# Patient Record
Sex: FEMALE | Race: WHITE | Employment: OTHER | URBAN - METROPOLITAN AREA
[De-identification: names, ages, dates, MRNs, and addresses within clinical notes are randomized per-mention and may not be internally consistent; named-entity substitution may affect disease eponyms.]

---

## 2018-02-27 ENCOUNTER — OFFICE VISIT (OUTPATIENT)
Dept: UROLOGY | Facility: HOSPITAL | Age: 73
End: 2018-02-27
Attending: OBSTETRICS & GYNECOLOGY
Payer: MEDICARE

## 2018-02-27 VITALS
BODY MASS INDEX: 20.76 KG/M2 | SYSTOLIC BLOOD PRESSURE: 104 MMHG | WEIGHT: 103 LBS | DIASTOLIC BLOOD PRESSURE: 68 MMHG | HEIGHT: 59 IN

## 2018-02-27 DIAGNOSIS — R35.1 NOCTURIA: Primary | ICD-10-CM

## 2018-02-27 DIAGNOSIS — N39.3 FEMALE STRESS INCONTINENCE: ICD-10-CM

## 2018-02-27 DIAGNOSIS — N95.2 POSTMENOPAUSAL ATROPHIC VAGINITIS: ICD-10-CM

## 2018-02-27 DIAGNOSIS — N81.2 UTEROVAGINAL PROLAPSE, INCOMPLETE: ICD-10-CM

## 2018-02-27 DIAGNOSIS — N81.84 PELVIC MUSCLE WASTING: ICD-10-CM

## 2018-02-27 LAB
BLOOD URINE: NEGATIVE
CONTROL RUN WITHIN 24 HOURS?: YES
LEUKOCYTE ESTERASE URINE: NEGATIVE
NITRITE URINE: NEGATIVE

## 2018-02-27 PROCEDURE — 87086 URINE CULTURE/COLONY COUNT: CPT | Performed by: OBSTETRICS & GYNECOLOGY

## 2018-02-27 PROCEDURE — 81002 URINALYSIS NONAUTO W/O SCOPE: CPT

## 2018-02-27 PROCEDURE — 99201 HC OUTPT EVAL AND MGNT NEW PT LEVEL 1: CPT

## 2018-02-27 NOTE — PROGRESS NOTES
Diamond Barton DO  2/27/2018     Referred by Dr. Kyle Christian    Patient presents with:  Prolapse: c/o bulge in vagina, leaking on the way to the bathroom, referred by Dr. Aldair Greenberg, tried pessary without success      HPI:  Some PETRA noted with pessary  Rare UUI +atrophy, nontender  Bladder:+fullness, nontender  Vagina: +atrophy  Cervix: no bleeding, no lesions, nontender  Uterus: +mobile  Adnexa:no masses, nontender  Perineum: nontender  Anus: wnl  Rectum: defer    PELVIS FLOOR NEUROMUSCULAR FUNCTION:  Strength: Hysterectomy-Vag, Abd, LAVH, Laporascopic Sacrocolpoxy Hysterectomy, Total Laporoscopic Hysterectomy, Robotic  Risks/benefits of salpingooophorectomy  Anterior repair  Posterior repair  Enterocele repair  Uterosacral suspension  Mid-urethral slings Darío Farias

## 2018-02-28 ENCOUNTER — TELEPHONE (OUTPATIENT)
Dept: UROLOGY | Facility: HOSPITAL | Age: 73
End: 2018-02-28

## 2018-03-05 ENCOUNTER — OFFICE VISIT (OUTPATIENT)
Dept: UROLOGY | Facility: HOSPITAL | Age: 73
End: 2018-03-05
Attending: OBSTETRICS & GYNECOLOGY
Payer: MEDICARE

## 2018-03-05 VITALS
WEIGHT: 103 LBS | BODY MASS INDEX: 20.76 KG/M2 | DIASTOLIC BLOOD PRESSURE: 72 MMHG | HEIGHT: 59 IN | SYSTOLIC BLOOD PRESSURE: 102 MMHG

## 2018-03-05 DIAGNOSIS — R35.1 NOCTURIA: Primary | ICD-10-CM

## 2018-03-05 DIAGNOSIS — N39.41 URGE INCONTINENCE: ICD-10-CM

## 2018-03-05 DIAGNOSIS — N81.2 UTEROVAGINAL PROLAPSE, INCOMPLETE: ICD-10-CM

## 2018-03-05 DIAGNOSIS — N39.3 FEMALE STRESS INCONTINENCE: ICD-10-CM

## 2018-03-05 DIAGNOSIS — N95.2 POSTMENOPAUSAL ATROPHIC VAGINITIS: ICD-10-CM

## 2018-03-05 DIAGNOSIS — N81.84 PELVIC MUSCLE WASTING: ICD-10-CM

## 2018-03-05 PROCEDURE — 51784 ANAL/URINARY MUSCLE STUDY: CPT

## 2018-03-05 PROCEDURE — 51797 INTRAABDOMINAL PRESSURE TEST: CPT

## 2018-03-05 PROCEDURE — 51741 ELECTRO-UROFLOWMETRY FIRST: CPT

## 2018-03-05 PROCEDURE — 51729 CYSTOMETROGRAM W/VP&UP: CPT

## 2018-03-05 NOTE — PROCEDURES
Patient here for urodynamic testing. Procedure explained and confirmed by patient. See evaluation form for results. Both verbal and written discharge instructions were given.   Patient tolerated procedure well and will follow up with Dr. Kodak Stone mL/min  Temp:  Room  Position:  [x]  Sit  []  Stand  []  Supine  First sensation:   25 mL  First desire to void:   100 mL  Strong desire to void:  339 mL  Maximum cystometric capacity:   459 mL  Bladder Compliance:   67 mL/cm water  Detrusor Activity:  []

## 2018-03-05 NOTE — PATIENT INSTRUCTIONS
[facility]  [address] [place] [address] 67, [address]  Office: [phone]      Urodynamic Testing Discharge Instructions: There are NO dietary or activity restrictions. You may resume your normal schedule.       You may hav at any time  · Chest pain or trouble breathing  · Vaginal bleeding heavier than a period  · Redness, tenderness or swelling of your legs  · Pain or burning when you urinate  · Redness, pain or foul discharge from incision          [address] with any pain medications (Motrin) as directed by the nurse. · Continue with your current bowel regime; avoid constipation. What if I have trouble emptying my bladder?   If you are having trouble emptying your bladder, try the following tips:  · Urinate

## 2018-03-09 ENCOUNTER — OFFICE VISIT (OUTPATIENT)
Dept: UROLOGY | Facility: HOSPITAL | Age: 73
End: 2018-03-09
Attending: OBSTETRICS & GYNECOLOGY
Payer: MEDICARE

## 2018-03-09 VITALS — WEIGHT: 103 LBS | RESPIRATION RATE: 20 BRPM | BODY MASS INDEX: 20.76 KG/M2 | HEIGHT: 59 IN

## 2018-03-09 DIAGNOSIS — N39.3 FEMALE STRESS INCONTINENCE: ICD-10-CM

## 2018-03-09 DIAGNOSIS — N81.2 UTEROVAGINAL PROLAPSE, INCOMPLETE: Primary | ICD-10-CM

## 2018-03-09 DIAGNOSIS — N81.84 PELVIC MUSCLE WASTING: ICD-10-CM

## 2018-03-09 PROCEDURE — 99211 OFF/OP EST MAY X REQ PHY/QHP: CPT

## 2018-04-04 ENCOUNTER — TELEPHONE (OUTPATIENT)
Dept: UROLOGY | Facility: HOSPITAL | Age: 73
End: 2018-04-04

## 2018-04-04 NOTE — TELEPHONE ENCOUNTER
TC to pt following surgery  Doing well, no complaints  Catheter draining clear urine, some urgency  +BM  Pain controlled with PO meds (IBP & norco)  Reviewed bowel mgmt and activity restrictions  Tolerates ambulation  No heavy vaginal bleeding, some spotti

## 2018-04-09 PROBLEM — N81.4 CYSTOCELE WITH UTERINE PROLAPSE: Status: ACTIVE | Noted: 2017-12-07

## 2018-04-09 PROBLEM — M85.89 OSTEOPENIA OF MULTIPLE SITES: Status: ACTIVE | Noted: 2018-03-16

## 2018-04-09 PROBLEM — A04.71 RECURRENT CLOSTRIDIUM DIFFICILE DIARRHEA: Status: ACTIVE | Noted: 2017-03-23

## 2018-04-09 PROBLEM — N81.9 PROLAPSE OF FEMALE PELVIC ORGANS: Status: ACTIVE | Noted: 2018-04-03

## 2018-04-17 ENCOUNTER — OFFICE VISIT (OUTPATIENT)
Dept: UROLOGY | Facility: HOSPITAL | Age: 73
End: 2018-04-17
Attending: OBSTETRICS & GYNECOLOGY
Payer: MEDICARE

## 2018-04-17 VITALS
SYSTOLIC BLOOD PRESSURE: 122 MMHG | DIASTOLIC BLOOD PRESSURE: 68 MMHG | HEIGHT: 59 IN | BODY MASS INDEX: 20.76 KG/M2 | WEIGHT: 103 LBS

## 2018-04-17 DIAGNOSIS — Z98.890 POST-OPERATIVE STATE: Primary | ICD-10-CM

## 2018-04-17 PROCEDURE — 99211 OFF/OP EST MAY X REQ PHY/QHP: CPT

## 2018-04-17 RX ORDER — MULTIVIT-MIN/IRON FUM/FOLIC AC 7.5 MG-4
1 TABLET ORAL DAILY
COMMUNITY

## 2018-04-17 NOTE — PROGRESS NOTES
She is s/p Post-Op Summary  Procedure Date: 04/02/18  Procedure Name: Vaginal Hysterectomy; Anterior/Posterior/Enterocele Repair;Uterosacral Ligament Suspension;Prolene Mesh Mid Urethral Sling;Cystoscopy; Bilateral Salpino-oophorectomy  Post-Op Symptoms: UUI

## 2018-04-17 NOTE — PATIENT INSTRUCTIONS
[id number] [address] PELVIC MEDICINE    BOWEL REGIMEN    Constipation can have detrimental effects on bladder function and can worsen the symptoms of prolapse. It is important to avoid constipation.     The first step for treating constipation is to i

## 2018-05-24 ENCOUNTER — TELEPHONE (OUTPATIENT)
Dept: UROLOGY | Facility: HOSPITAL | Age: 73
End: 2018-05-24

## 2018-05-24 ENCOUNTER — APPOINTMENT (OUTPATIENT)
Dept: LAB | Facility: HOSPITAL | Age: 73
End: 2018-05-24
Attending: OBSTETRICS & GYNECOLOGY
Payer: MEDICARE

## 2018-05-24 DIAGNOSIS — R35.0 FREQUENCY OF URINATION: ICD-10-CM

## 2018-05-24 DIAGNOSIS — R35.0 FREQUENCY OF URINATION: Primary | ICD-10-CM

## 2018-05-24 PROCEDURE — 87186 SC STD MICRODIL/AGAR DIL: CPT

## 2018-05-24 PROCEDURE — 87077 CULTURE AEROBIC IDENTIFY: CPT

## 2018-05-24 PROCEDURE — 87086 URINE CULTURE/COLONY COUNT: CPT

## 2018-05-24 PROCEDURE — 81001 URINALYSIS AUTO W/SCOPE: CPT

## 2018-05-24 NOTE — TELEPHONE ENCOUNTER
Patient called, feels she may have a UTI or developing an overactive bladder, c/o frequency, nocturia, leaking small amounts of urine, denies constipation, not sure if she needs to see Dr. Mame Preciado, suggested we get a urine specimen to rule out a UTI, Not

## 2018-05-25 ENCOUNTER — TELEPHONE (OUTPATIENT)
Dept: UROLOGY | Facility: HOSPITAL | Age: 73
End: 2018-05-25

## 2018-05-25 RX ORDER — NITROFURANTOIN 25; 75 MG/1; MG/1
100 CAPSULE ORAL 2 TIMES DAILY
Qty: 14 CAPSULE | Refills: 0 | Status: SHIPPED | OUTPATIENT
Start: 2018-05-25 | End: 2018-07-10 | Stop reason: ALTCHOICE

## 2018-05-25 NOTE — TELEPHONE ENCOUNTER
VORB per Dr. Josefa Seymour, Macrobid 100 mg one tab po BID for 7 days, pt notified of UA results and possible UTI, will start her on an ABX before the weekend, pt states many antibiotics cause stomach upset and she cannot take, she has not taken Macrobid, enco

## 2018-05-29 NOTE — TELEPHONE ENCOUNTER
TC to pt  Reviewed ucx results  abx helping, will complete abx as prescribed  Less leakage, no dysuria  Pt reports some bulge palpated in shower    Reassured pt  Follow as scheduled, sooner prn  Call if s/sx of UTI fail to resolve  She understands and agre

## 2018-07-10 ENCOUNTER — OFFICE VISIT (OUTPATIENT)
Dept: UROLOGY | Facility: HOSPITAL | Age: 73
End: 2018-07-10
Attending: OBSTETRICS & GYNECOLOGY
Payer: MEDICARE

## 2018-07-10 VITALS
HEIGHT: 56.5 IN | DIASTOLIC BLOOD PRESSURE: 68 MMHG | WEIGHT: 105 LBS | SYSTOLIC BLOOD PRESSURE: 130 MMHG | BODY MASS INDEX: 22.97 KG/M2

## 2018-07-10 DIAGNOSIS — N81.84 PELVIC MUSCLE WASTING: ICD-10-CM

## 2018-07-10 DIAGNOSIS — R39.9 URINARY TRACT INFECTION SYMPTOMS: ICD-10-CM

## 2018-07-10 DIAGNOSIS — N95.2 POSTMENOPAUSAL ATROPHIC VAGINITIS: ICD-10-CM

## 2018-07-10 DIAGNOSIS — R35.1 NOCTURIA: Primary | ICD-10-CM

## 2018-07-10 DIAGNOSIS — Z98.890 POST-OPERATIVE STATE: ICD-10-CM

## 2018-07-10 LAB
BLOOD URINE: NEGATIVE
CONTROL RUN WITHIN 24 HOURS?: YES
NITRITE URINE: NEGATIVE

## 2018-07-10 PROCEDURE — 87086 URINE CULTURE/COLONY COUNT: CPT | Performed by: OBSTETRICS & GYNECOLOGY

## 2018-07-10 PROCEDURE — 99211 OFF/OP EST MAY X REQ PHY/QHP: CPT

## 2018-07-10 PROCEDURE — 81002 URINALYSIS NONAUTO W/O SCOPE: CPT

## 2018-07-10 RX ORDER — ESTRADIOL 0.1 MG/G
CREAM VAGINAL
Qty: 1 TUBE | Refills: 3 | Status: SHIPPED | OUTPATIENT
Start: 2018-07-10

## 2018-07-10 NOTE — PATIENT INSTRUCTIONS
[address] [address] PELVIC MEDICINE    BOWEL REGIMEN    Constipation can have detrimental effects on bladder function and can worsen the symptoms of prolapse. It is important to avoid constipation.     The first step for treating constipation is to i around your vagina. You want to draw the muscles quickly and deliberately together as though you were trying to stop urination or gas from passing from the rectum. Once you have pulled the muscles together, hold the contraction for at least 5 seconds.   Valerie Cummins third week you are doing 5 sets of contractions each day with 20 contractions each time. That’s 100 contractions each day! Eventually you should be able to hold each contraction for a full 10 seconds.   Once you feel comfortable with the exercises, you

## 2018-07-10 NOTE — PROGRESS NOTES
She is s/p Post-Op Summary  Procedure Date: 04/02/18  Procedure Name: Vaginal Hysterectomy;Bilateral Salpino-oophorectomy;Uterosacral Ligament Suspension; Anterior/Posterior/Enterocele Repair;Cystoscopy;Prolene Mesh Mid Urethral Sling  Post-Op Symptoms: Pro

## 2018-07-12 ENCOUNTER — TELEPHONE (OUTPATIENT)
Dept: UROLOGY | Facility: HOSPITAL | Age: 73
End: 2018-07-12

## 2018-07-12 NOTE — TELEPHONE ENCOUNTER
Patient aware of  Negative urine culture. Patient requesting compounding for estradiol. Faxed prescription to [facility].

## 2019-05-21 ENCOUNTER — OFFICE VISIT (OUTPATIENT)
Dept: UROLOGY | Facility: HOSPITAL | Age: 74
End: 2019-05-21
Attending: OBSTETRICS & GYNECOLOGY
Payer: MEDICARE

## 2019-05-21 VITALS
BODY MASS INDEX: 22.97 KG/M2 | WEIGHT: 105 LBS | DIASTOLIC BLOOD PRESSURE: 86 MMHG | SYSTOLIC BLOOD PRESSURE: 132 MMHG | HEIGHT: 56.5 IN

## 2019-05-21 DIAGNOSIS — N95.2 POSTMENOPAUSAL ATROPHIC VAGINITIS: Primary | ICD-10-CM

## 2019-05-21 DIAGNOSIS — N81.84 PELVIC MUSCLE WASTING: ICD-10-CM

## 2019-05-21 PROCEDURE — 99211 OFF/OP EST MAY X REQ PHY/QHP: CPT

## 2019-05-21 NOTE — PATIENT INSTRUCTIONS
[facility] [place] [facility]    PELVIC MUSCLE EXERCISES [facility])    The muscles that surround the vagina help to support the pelvic organs and maintain bladder and bowel control are called the “pelvic floor muscles”.   Exercis maintain constant effort in an active squeeze in order to strengthen the muscles. It is better to maintain a strong active squeeze for a short period of time that to flick the muscle on and off for any period of time.   You will need to work up to a full 1 high into your vagina.

## 2019-05-21 NOTE — PROGRESS NOTES
She is s/p Post-Op Summary  Procedure Date: 04/02/18  Procedure Name: Vaginal Hysterectomy;Bilateral Salpino-oophorectomy; Anterior/Posterior/Enterocele Repair;Cystoscopy;Prolene Mesh Mid Urethral Sling;Uterosacral Ligament Suspension  Post-Op Symptoms: Mary William

## 2019-07-30 ENCOUNTER — WALK IN (OUTPATIENT)
Dept: URGENT CARE | Age: 74
End: 2019-07-30

## 2019-07-30 ENCOUNTER — IMAGING SERVICES (OUTPATIENT)
Dept: GENERAL RADIOLOGY | Age: 74
End: 2019-07-30
Attending: FAMILY MEDICINE

## 2019-07-30 DIAGNOSIS — S63.501A SPRAIN OF RIGHT WRIST, INITIAL ENCOUNTER: ICD-10-CM

## 2019-07-30 DIAGNOSIS — S52.502A CLOSED FRACTURE OF DISTAL END OF LEFT RADIUS, UNSPECIFIED FRACTURE MORPHOLOGY, INITIAL ENCOUNTER: Primary | ICD-10-CM

## 2019-07-30 PROCEDURE — X1094 NO CHARGE VISIT: HCPCS

## 2019-07-30 PROCEDURE — 99204 OFFICE O/P NEW MOD 45 MIN: CPT | Performed by: FAMILY MEDICINE

## 2019-07-30 PROCEDURE — 73110 X-RAY EXAM OF WRIST: CPT | Performed by: RADIOLOGY

## 2019-07-30 PROCEDURE — A4565 SLINGS: HCPCS | Performed by: FAMILY MEDICINE

## 2019-07-30 PROCEDURE — 29125 APPL SHORT ARM SPLINT STATIC: CPT | Performed by: FAMILY MEDICINE

## 2019-07-30 ASSESSMENT — PAIN SCALES - GENERAL: PAINLEVEL: 5-6

## 2019-08-02 ENCOUNTER — OFFICE VISIT (OUTPATIENT)
Dept: SPORTS MEDICINE | Age: 74
End: 2019-08-02
Attending: FAMILY MEDICINE

## 2019-08-02 ENCOUNTER — IMAGING SERVICES (OUTPATIENT)
Dept: CT IMAGING | Age: 74
End: 2019-08-02
Attending: FAMILY MEDICINE

## 2019-08-02 ENCOUNTER — IMAGING SERVICES (OUTPATIENT)
Dept: CT IMAGING | Age: 74
End: 2019-08-02

## 2019-08-02 VITALS
WEIGHT: 111 LBS | BODY MASS INDEX: 23.3 KG/M2 | HEIGHT: 58 IN | SYSTOLIC BLOOD PRESSURE: 104 MMHG | HEART RATE: 60 BPM | DIASTOLIC BLOOD PRESSURE: 70 MMHG

## 2019-08-02 DIAGNOSIS — M25.531 RIGHT WRIST PAIN: ICD-10-CM

## 2019-08-02 DIAGNOSIS — S52.611G CLOSED DISPLACED FRACTURE OF STYLOID PROCESS OF RIGHT ULNA WITH DELAYED HEALING, SUBSEQUENT ENCOUNTER: ICD-10-CM

## 2019-08-02 DIAGNOSIS — S52.571A OTHER CLOSED INTRA-ARTICULAR FRACTURE OF DISTAL END OF RIGHT RADIUS, INITIAL ENCOUNTER: ICD-10-CM

## 2019-08-02 DIAGNOSIS — M25.531 RIGHT WRIST PAIN: Primary | ICD-10-CM

## 2019-08-02 PROBLEM — M85.89 OSTEOPENIA OF MULTIPLE SITES: Status: ACTIVE | Noted: 2018-03-16

## 2019-08-02 PROCEDURE — 99204 OFFICE O/P NEW MOD 45 MIN: CPT | Performed by: FAMILY MEDICINE

## 2019-08-02 PROCEDURE — 73200 CT UPPER EXTREMITY W/O DYE: CPT | Performed by: RADIOLOGY

## 2019-08-02 RX ORDER — MELOXICAM 7.5 MG/1
7.5 TABLET ORAL DAILY
Qty: 10 TABLET | Refills: 0 | Status: SHIPPED | OUTPATIENT
Start: 2019-08-02 | End: 2019-08-12

## 2019-08-02 RX ORDER — MULTIVIT-MIN/IRON FUM/FOLIC AC 7.5 MG-4
1 TABLET ORAL
COMMUNITY

## 2019-08-02 RX ORDER — POLYETHYLENE GLYCOL 3350 17 G/17G
17 POWDER, FOR SOLUTION ORAL
COMMUNITY

## 2019-08-02 RX ORDER — ASPIRIN 81 MG/1
81 TABLET, CHEWABLE ORAL
COMMUNITY

## 2019-08-05 ENCOUNTER — OFFICE VISIT (OUTPATIENT)
Dept: SPORTS MEDICINE | Age: 74
End: 2019-08-05

## 2019-08-05 VITALS
SYSTOLIC BLOOD PRESSURE: 118 MMHG | HEART RATE: 78 BPM | BODY MASS INDEX: 23.3 KG/M2 | DIASTOLIC BLOOD PRESSURE: 70 MMHG | WEIGHT: 111 LBS | HEIGHT: 58 IN

## 2019-08-05 DIAGNOSIS — M25.531 RIGHT WRIST PAIN: Primary | ICD-10-CM

## 2019-08-05 DIAGNOSIS — S52.571G OTHER CLOSED INTRA-ARTICULAR FRACTURE OF DISTAL END OF RIGHT RADIUS WITH DELAYED HEALING, SUBSEQUENT ENCOUNTER: ICD-10-CM

## 2019-08-05 DIAGNOSIS — S52.611G CLOSED DISPLACED FRACTURE OF STYLOID PROCESS OF RIGHT ULNA WITH DELAYED HEALING, SUBSEQUENT ENCOUNTER: ICD-10-CM

## 2019-08-05 PROCEDURE — 99214 OFFICE O/P EST MOD 30 MIN: CPT | Performed by: FAMILY MEDICINE

## 2019-08-05 PROCEDURE — L3982 UPPER EXT FX ORTHOSIS RAD/UL: HCPCS

## 2019-08-26 ENCOUNTER — OFFICE VISIT (OUTPATIENT)
Dept: SPORTS MEDICINE | Age: 74
End: 2019-08-26

## 2019-08-26 ENCOUNTER — IMAGING SERVICES (OUTPATIENT)
Dept: GENERAL RADIOLOGY | Age: 74
End: 2019-08-26
Attending: FAMILY MEDICINE

## 2019-08-26 VITALS
BODY MASS INDEX: 22.67 KG/M2 | WEIGHT: 108 LBS | DIASTOLIC BLOOD PRESSURE: 68 MMHG | SYSTOLIC BLOOD PRESSURE: 122 MMHG | HEIGHT: 58 IN | RESPIRATION RATE: 12 BRPM | HEART RATE: 66 BPM

## 2019-08-26 DIAGNOSIS — S52.571G OTHER CLOSED INTRA-ARTICULAR FRACTURE OF DISTAL END OF RIGHT RADIUS WITH DELAYED HEALING, SUBSEQUENT ENCOUNTER: ICD-10-CM

## 2019-08-26 DIAGNOSIS — S52.571G OTHER CLOSED INTRA-ARTICULAR FRACTURE OF DISTAL END OF RIGHT RADIUS WITH DELAYED HEALING, SUBSEQUENT ENCOUNTER: Primary | ICD-10-CM

## 2019-08-26 DIAGNOSIS — S52.611G CLOSED DISPLACED FRACTURE OF STYLOID PROCESS OF RIGHT ULNA WITH DELAYED HEALING, SUBSEQUENT ENCOUNTER: ICD-10-CM

## 2019-08-26 PROCEDURE — 73110 X-RAY EXAM OF WRIST: CPT | Performed by: FAMILY MEDICINE

## 2019-08-26 PROCEDURE — 99214 OFFICE O/P EST MOD 30 MIN: CPT | Performed by: FAMILY MEDICINE

## 2019-08-29 ENCOUNTER — EXTERNAL RECORD (OUTPATIENT)
Dept: HEALTH INFORMATION MANAGEMENT | Facility: OTHER | Age: 74
End: 2019-08-29

## 2019-09-30 ENCOUNTER — EXTERNAL RECORD (OUTPATIENT)
Dept: HEALTH INFORMATION MANAGEMENT | Facility: OTHER | Age: 74
End: 2019-09-30

## 2019-10-04 ENCOUNTER — IMAGING SERVICES (OUTPATIENT)
Dept: GENERAL RADIOLOGY | Age: 74
End: 2019-10-04
Attending: FAMILY MEDICINE

## 2019-10-04 ENCOUNTER — OFFICE VISIT (OUTPATIENT)
Dept: SPORTS MEDICINE | Age: 74
End: 2019-10-04

## 2019-10-04 VITALS
SYSTOLIC BLOOD PRESSURE: 116 MMHG | WEIGHT: 108 LBS | BODY MASS INDEX: 22.57 KG/M2 | HEART RATE: 68 BPM | DIASTOLIC BLOOD PRESSURE: 66 MMHG

## 2019-10-04 DIAGNOSIS — M25.531 RIGHT WRIST PAIN: ICD-10-CM

## 2019-10-04 DIAGNOSIS — M25.531 RIGHT WRIST PAIN: Primary | ICD-10-CM

## 2019-10-04 DIAGNOSIS — M19.039 WRIST ARTHRITIS: ICD-10-CM

## 2019-10-04 PROCEDURE — 99214 OFFICE O/P EST MOD 30 MIN: CPT | Performed by: FAMILY MEDICINE

## 2019-10-04 PROCEDURE — 73110 X-RAY EXAM OF WRIST: CPT | Performed by: FAMILY MEDICINE

## 2019-10-22 ENCOUNTER — EXTERNAL RECORD (OUTPATIENT)
Dept: HEALTH INFORMATION MANAGEMENT | Facility: OTHER | Age: 74
End: 2019-10-22

## 2021-05-25 VITALS
SYSTOLIC BLOOD PRESSURE: 120 MMHG | HEART RATE: 80 BPM | RESPIRATION RATE: 14 BRPM | TEMPERATURE: 98.3 F | DIASTOLIC BLOOD PRESSURE: 70 MMHG

## 2021-07-23 ENCOUNTER — TELEPHONE (OUTPATIENT)
Dept: OTHER | Age: 76
End: 2021-07-23

## (undated) NOTE — LETTER
Consent to Procedure/Sedation    Date: __3/5/2018_____    Time: ___1:13 PM ___    1. I authorize the performance upon Tia Res the following:  Urodynamics (UDS)      2.  Nik Holt(and whomever is designated as the doctor’s as ___________________________    ___________________    Witness: ____________________     Date: ______________    Printed: 3/5/2018   1:13 PM    Patient Name: Russel Aguilar        : 1945       Medical Record #: [id number]